# Patient Record
Sex: MALE | Race: WHITE | NOT HISPANIC OR LATINO | Employment: OTHER | URBAN - METROPOLITAN AREA
[De-identification: names, ages, dates, MRNs, and addresses within clinical notes are randomized per-mention and may not be internally consistent; named-entity substitution may affect disease eponyms.]

---

## 2018-05-08 ENCOUNTER — NEW PATIENT (OUTPATIENT)
Dept: URBAN - METROPOLITAN AREA CLINIC 26 | Facility: CLINIC | Age: 83
End: 2018-05-08

## 2018-05-08 VITALS
DIASTOLIC BLOOD PRESSURE: 60 MMHG | HEIGHT: 62 IN | WEIGHT: 138 LBS | BODY MASS INDEX: 25.4 KG/M2 | SYSTOLIC BLOOD PRESSURE: 132 MMHG

## 2018-05-08 DIAGNOSIS — H35.3231: ICD-10-CM

## 2018-05-08 DIAGNOSIS — H35.61: ICD-10-CM

## 2018-05-08 DIAGNOSIS — H43.393: ICD-10-CM

## 2018-05-08 PROCEDURE — 99204 OFFICE O/P NEW MOD 45 MIN: CPT

## 2018-05-08 PROCEDURE — 67028 INJECTION EYE DRUG: CPT

## 2018-05-08 PROCEDURE — 92134 CPTRZ OPH DX IMG PST SGM RTA: CPT

## 2018-05-08 PROCEDURE — 92250 FUNDUS PHOTOGRAPHY W/I&R: CPT

## 2018-05-08 ASSESSMENT — VISUAL ACUITY
OS_SC: CF 3FT
OD_SC: 20/400+1

## 2018-05-08 ASSESSMENT — TONOMETRY
OD_IOP_MMHG: 13
OS_IOP_MMHG: 11

## 2018-08-29 NOTE — PATIENT DISCUSSION
offered to dilate the eye today but pt refused as he is driving. No other signs of trauma. Disc call with any changes to va.

## 2019-01-31 ENCOUNTER — HOSPITAL ENCOUNTER (EMERGENCY)
Facility: HOSPITAL | Age: 84
Discharge: HOME | End: 2019-01-31
Attending: EMERGENCY MEDICINE
Payer: MEDICARE

## 2019-01-31 ENCOUNTER — APPOINTMENT (OUTPATIENT)
Dept: CT IMAGING | Facility: HOSPITAL | Age: 84
End: 2019-01-31
Attending: EMERGENCY MEDICINE
Payer: MEDICARE

## 2019-01-31 VITALS
HEIGHT: 63 IN | BODY MASS INDEX: 22.68 KG/M2 | OXYGEN SATURATION: 99 % | RESPIRATION RATE: 18 BRPM | HEART RATE: 91 BPM | TEMPERATURE: 97 F | WEIGHT: 128 LBS | SYSTOLIC BLOOD PRESSURE: 133 MMHG | DIASTOLIC BLOOD PRESSURE: 67 MMHG

## 2019-01-31 DIAGNOSIS — H01.00B BLEPHARITIS OF BOTH UPPER AND LOWER EYELID OF LEFT EYE, UNSPECIFIED TYPE: ICD-10-CM

## 2019-01-31 DIAGNOSIS — S09.90XA INJURY OF HEAD, INITIAL ENCOUNTER: Primary | ICD-10-CM

## 2019-01-31 DIAGNOSIS — S01.01XA LACERATION OF SCALP, INITIAL ENCOUNTER: ICD-10-CM

## 2019-01-31 LAB
ANION GAP SERPL CALC-SCNC: 12 MMOL/L (ref 0–18)
APTT PPP: 35.4 SECONDS (ref 23.2–35.3)
BASOPHILS # BLD AUTO: 0.06 X10(3) UL (ref 0–0.2)
BASOPHILS NFR BLD AUTO: 0.7 %
BUN SERPL-MCNC: 38 MG/DL (ref 8–20)
BUN/CREAT SERPL: 21.7 (ref 10–20)
CALCIUM SERPL-MCNC: 9.4 MG/DL (ref 8.5–10.5)
CHLORIDE SERPL-SCNC: 99 MMOL/L (ref 95–110)
CO2 SERPL-SCNC: 24 MMOL/L (ref 22–32)
CREAT SERPL-MCNC: 1.75 MG/DL (ref 0.5–1.5)
DEPRECATED RDW RBC AUTO: 54.4 FL (ref 35.1–46.3)
EOSINOPHIL # BLD AUTO: 0.4 X10(3) UL (ref 0–0.7)
EOSINOPHIL NFR BLD AUTO: 4.8 %
ERYTHROCYTE [DISTWIDTH] IN BLOOD BY AUTOMATED COUNT: 15.9 % (ref 11–15)
GLUCOSE SERPL-MCNC: 147 MG/DL (ref 70–99)
HCT VFR BLD AUTO: 39.9 % (ref 39–53)
HGB BLD-MCNC: 11.9 G/DL (ref 13–17.5)
IMM GRANULOCYTES # BLD AUTO: 0.06 X10(3) UL (ref 0–1)
IMM GRANULOCYTES NFR BLD: 0.7 %
INR BLD: 1.1 (ref 0.9–1.2)
LYMPHOCYTES # BLD AUTO: 1.11 X10(3) UL (ref 1–4)
LYMPHOCYTES NFR BLD AUTO: 13.4 %
MCH RBC QN AUTO: 27.9 PG (ref 26–34)
MCHC RBC AUTO-ENTMCNC: 29.8 G/DL (ref 31–37)
MCV RBC AUTO: 93.7 FL (ref 80–100)
MONOCYTES # BLD AUTO: 0.79 X10(3) UL (ref 0.1–1)
MONOCYTES NFR BLD AUTO: 9.5 %
NEUTROPHILS # BLD AUTO: 5.86 X10 (3) UL (ref 1.5–7.7)
NEUTROPHILS # BLD AUTO: 5.86 X10(3) UL (ref 1.5–7.7)
NEUTROPHILS NFR BLD AUTO: 70.9 %
OSMOLALITY UR CALC.SUM OF ELEC: 292 MOSM/KG (ref 275–295)
PLATELET # BLD AUTO: 207 10(3)UL (ref 150–450)
POTASSIUM SERPL-SCNC: 3.9 MMOL/L (ref 3.3–5.1)
PROTHROMBIN TIME: 14.3 SECONDS (ref 11.8–14.5)
RBC # BLD AUTO: 4.26 X10(6)UL (ref 3.8–5.8)
SODIUM SERPL-SCNC: 135 MMOL/L (ref 136–144)
WBC # BLD AUTO: 8.3 X10(3) UL (ref 4–11)

## 2019-01-31 PROCEDURE — 36415 COLL VENOUS BLD VENIPUNCTURE: CPT

## 2019-01-31 PROCEDURE — 85610 PROTHROMBIN TIME: CPT | Performed by: EMERGENCY MEDICINE

## 2019-01-31 PROCEDURE — 85730 THROMBOPLASTIN TIME PARTIAL: CPT | Performed by: EMERGENCY MEDICINE

## 2019-01-31 PROCEDURE — 99284 EMERGENCY DEPT VISIT MOD MDM: CPT

## 2019-01-31 PROCEDURE — 90471 IMMUNIZATION ADMIN: CPT

## 2019-01-31 PROCEDURE — 80048 BASIC METABOLIC PNL TOTAL CA: CPT | Performed by: EMERGENCY MEDICINE

## 2019-01-31 PROCEDURE — 12001 RPR S/N/AX/GEN/TRNK 2.5CM/<: CPT

## 2019-01-31 PROCEDURE — 85025 COMPLETE CBC W/AUTO DIFF WBC: CPT | Performed by: EMERGENCY MEDICINE

## 2019-01-31 PROCEDURE — 72125 CT NECK SPINE W/O DYE: CPT | Performed by: EMERGENCY MEDICINE

## 2019-01-31 PROCEDURE — 70450 CT HEAD/BRAIN W/O DYE: CPT | Performed by: EMERGENCY MEDICINE

## 2019-01-31 RX ORDER — LOVASTATIN 40 MG/1
40 TABLET ORAL NIGHTLY
COMMUNITY

## 2019-01-31 RX ORDER — LIDOCAINE HYDROCHLORIDE 10 MG/ML
INJECTION, SOLUTION EPIDURAL; INFILTRATION; INTRACAUDAL; PERINEURAL
Status: DISCONTINUED
Start: 2019-01-31 | End: 2019-01-31

## 2019-01-31 RX ORDER — POLYETHYLENE GLYCOL 3350 17 G/17G
17 POWDER, FOR SOLUTION ORAL DAILY
COMMUNITY

## 2019-01-31 RX ORDER — DIGOXIN 125 MCG
125 TABLET ORAL
COMMUNITY

## 2019-01-31 RX ORDER — LIDOCAINE HYDROCHLORIDE 10 MG/ML
30 INJECTION, SOLUTION EPIDURAL; INFILTRATION; INTRACAUDAL; PERINEURAL ONCE
Status: COMPLETED | OUTPATIENT
Start: 2019-01-31 | End: 2019-01-31

## 2019-01-31 RX ORDER — MELATONIN
325
Status: ON HOLD | COMMUNITY
End: 2021-01-01

## 2019-01-31 RX ORDER — LIDOCAINE HYDROCHLORIDE 10 MG/ML
30 INJECTION, SOLUTION EPIDURAL; INFILTRATION; INTRACAUDAL; PERINEURAL ONCE
Status: DISCONTINUED | OUTPATIENT
Start: 2019-01-31 | End: 2019-01-31

## 2019-01-31 RX ORDER — LEVOTHYROXINE SODIUM 0.12 MG/1
125 TABLET ORAL
COMMUNITY

## 2019-02-01 NOTE — ED INITIAL ASSESSMENT (HPI)
Pt fell at home. Was going to the bathroom, misjudged distance bewteen toilet, fell back, hitting back of the head on the toilet. Lac noted to back of head. Denies LOC.

## 2019-02-01 NOTE — ED PROVIDER NOTES
Patient Seen in: [facility] AND [facility] Emergency Department    History   Patient presents with:  Fall (musculoskeletal, neurologic)    Stated Complaint: fall    HPI    Presents to the emergency department after falling.   He states that he was sitting down on Cardiovascular: Normal rate and regular rhythm. No murmur heard. Pulmonary/Chest: Effort normal and breath sounds normal. No respiratory distress. Abdominal: Soft. He exhibits no distension. There is no tenderness.    Musculoskeletal: Normal range of 1/31/2019  CONCLUSION:  Moderate chronic microvascular ischemic disease without acute intracranial abnormality. Mild posterior occipital scalp soft tissue swelling.   Dictated by (CST): Shaniqua Croft MD on 1/31/2019 at 19:27     Approved by (CST): Tamia Solano both upper and lower eyelid of left eye, unspecified type    Disposition:  Discharge  1/31/2019  8:09 pm    Follow-up:  Fadia Aguilera  [address] [place] [address] [phone]    Schedule an appointment as soon as possible for a vis

## 2021-01-01 ENCOUNTER — HOSPITAL ENCOUNTER (EMERGENCY)
Facility: HOSPITAL | Age: 86
Discharge: HOME | End: 2021-01-01
Attending: EMERGENCY MEDICINE
Payer: MEDICARE

## 2021-01-01 ENCOUNTER — APPOINTMENT (OUTPATIENT)
Dept: CT IMAGING | Facility: HOSPITAL | Age: 86
End: 2021-01-01
Attending: EMERGENCY MEDICINE
Payer: MEDICARE

## 2021-01-01 ENCOUNTER — APPOINTMENT (OUTPATIENT)
Dept: CV DIAGNOSTICS | Facility: HOSPITAL | Age: 86
DRG: 683 | End: 2021-01-01
Attending: NURSE PRACTITIONER
Payer: MEDICARE

## 2021-01-01 ENCOUNTER — APPOINTMENT (OUTPATIENT)
Dept: GENERAL RADIOLOGY | Facility: HOSPITAL | Age: 86
DRG: 683 | End: 2021-01-01
Attending: EMERGENCY MEDICINE
Payer: MEDICARE

## 2021-01-01 ENCOUNTER — HOSPITAL ENCOUNTER (INPATIENT)
Facility: HOSPITAL | Age: 86
LOS: 2 days | Discharge: HOME HEALTH | DRG: 683 | End: 2021-01-01
Attending: EMERGENCY MEDICINE | Admitting: HOSPITALIST
Payer: MEDICARE

## 2021-01-01 ENCOUNTER — APPOINTMENT (OUTPATIENT)
Dept: NUCLEAR MEDICINE | Facility: HOSPITAL | Age: 86
DRG: 683 | End: 2021-01-01
Attending: EMERGENCY MEDICINE
Payer: MEDICARE

## 2021-01-01 ENCOUNTER — TELEPHONE (OUTPATIENT)
Dept: PALLIATIVE CARE | Facility: HOSPITAL | Age: 86
End: 2021-01-01

## 2021-01-01 ENCOUNTER — APPOINTMENT (OUTPATIENT)
Dept: GENERAL RADIOLOGY | Facility: HOSPITAL | Age: 86
DRG: 683 | End: 2021-01-01
Attending: HOSPITALIST
Payer: MEDICARE

## 2021-01-01 ENCOUNTER — APPOINTMENT (OUTPATIENT)
Dept: GENERAL RADIOLOGY | Facility: HOSPITAL | Age: 86
End: 2021-01-01
Attending: EMERGENCY MEDICINE
Payer: MEDICARE

## 2021-01-01 VITALS
DIASTOLIC BLOOD PRESSURE: 56 MMHG | SYSTOLIC BLOOD PRESSURE: 133 MMHG | TEMPERATURE: 99 F | HEART RATE: 67 BPM | HEIGHT: 63 IN | OXYGEN SATURATION: 100 % | RESPIRATION RATE: 18 BRPM | BODY MASS INDEX: 19.49 KG/M2 | WEIGHT: 110 LBS

## 2021-01-01 VITALS
HEART RATE: 76 BPM | WEIGHT: 99.5 LBS | OXYGEN SATURATION: 100 % | RESPIRATION RATE: 18 BRPM | TEMPERATURE: 98 F | DIASTOLIC BLOOD PRESSURE: 74 MMHG | HEIGHT: 68 IN | BODY MASS INDEX: 15.08 KG/M2 | SYSTOLIC BLOOD PRESSURE: 93 MMHG

## 2021-01-01 VITALS
SYSTOLIC BLOOD PRESSURE: 123 MMHG | TEMPERATURE: 99 F | RESPIRATION RATE: 16 BRPM | OXYGEN SATURATION: 96 % | BODY MASS INDEX: 16.07 KG/M2 | DIASTOLIC BLOOD PRESSURE: 49 MMHG | HEIGHT: 66 IN | WEIGHT: 100 LBS | HEART RATE: 109 BPM

## 2021-01-01 DIAGNOSIS — N17.9 AKI (ACUTE KIDNEY INJURY) (HCC): Primary | ICD-10-CM

## 2021-01-01 DIAGNOSIS — R91.1 PULMONARY NODULE: ICD-10-CM

## 2021-01-01 DIAGNOSIS — R11.2 NON-INTRACTABLE VOMITING WITH NAUSEA, UNSPECIFIED VOMITING TYPE: ICD-10-CM

## 2021-01-01 DIAGNOSIS — R77.8 ELEVATED TROPONIN: ICD-10-CM

## 2021-01-01 DIAGNOSIS — S09.8XXA BLUNT HEAD TRAUMA, INITIAL ENCOUNTER: Primary | ICD-10-CM

## 2021-01-01 PROCEDURE — 99223 1ST HOSP IP/OBS HIGH 75: CPT | Performed by: REGISTERED NURSE

## 2021-01-01 PROCEDURE — 93306 TTE W/DOPPLER COMPLETE: CPT | Performed by: NURSE PRACTITIONER

## 2021-01-01 PROCEDURE — 99239 HOSP IP/OBS DSCHRG MGMT >30: CPT | Performed by: HOSPITALIST

## 2021-01-01 PROCEDURE — 70450 CT HEAD/BRAIN W/O DYE: CPT | Performed by: EMERGENCY MEDICINE

## 2021-01-01 PROCEDURE — 99284 EMERGENCY DEPT VISIT MOD MDM: CPT

## 2021-01-01 PROCEDURE — 96374 THER/PROPH/DIAG INJ IV PUSH: CPT

## 2021-01-01 PROCEDURE — 71045 X-RAY EXAM CHEST 1 VIEW: CPT | Performed by: EMERGENCY MEDICINE

## 2021-01-01 PROCEDURE — 80048 BASIC METABOLIC PNL TOTAL CA: CPT | Performed by: EMERGENCY MEDICINE

## 2021-01-01 PROCEDURE — 78582 LUNG VENTILAT&PERFUS IMAGING: CPT | Performed by: EMERGENCY MEDICINE

## 2021-01-01 PROCEDURE — 74230 X-RAY XM SWLNG FUNCJ C+: CPT | Performed by: HOSPITALIST

## 2021-01-01 PROCEDURE — 85025 COMPLETE CBC W/AUTO DIFF WBC: CPT | Performed by: EMERGENCY MEDICINE

## 2021-01-01 PROCEDURE — 93005 ELECTROCARDIOGRAM TRACING: CPT

## 2021-01-01 PROCEDURE — 96361 HYDRATE IV INFUSION ADD-ON: CPT

## 2021-01-01 PROCEDURE — 93010 ELECTROCARDIOGRAM REPORT: CPT | Performed by: EMERGENCY MEDICINE

## 2021-01-01 PROCEDURE — 84484 ASSAY OF TROPONIN QUANT: CPT | Performed by: EMERGENCY MEDICINE

## 2021-01-01 PROCEDURE — 99223 1ST HOSP IP/OBS HIGH 75: CPT | Performed by: HOSPITALIST

## 2021-01-01 RX ORDER — ASPIRIN 81 MG/1
324 TABLET, CHEWABLE ORAL ONCE
Status: COMPLETED | OUTPATIENT
Start: 2021-01-01 | End: 2021-01-01

## 2021-01-01 RX ORDER — MELATONIN
325
Status: DISCONTINUED | OUTPATIENT
Start: 2021-01-01 | End: 2021-01-01

## 2021-01-01 RX ORDER — CHOLECALCIFEROL (VITAMIN D3) 125 MCG
1000 CAPSULE ORAL DAILY
Status: DISCONTINUED | OUTPATIENT
Start: 2021-01-01 | End: 2021-01-01

## 2021-01-01 RX ORDER — ONDANSETRON 2 MG/ML
4 INJECTION INTRAMUSCULAR; INTRAVENOUS ONCE
Status: COMPLETED | OUTPATIENT
Start: 2021-01-01 | End: 2021-01-01

## 2021-01-01 RX ORDER — POLYETHYLENE GLYCOL 3350 17 G/17G
17 POWDER, FOR SOLUTION ORAL DAILY
Status: DISCONTINUED | OUTPATIENT
Start: 2021-01-01 | End: 2021-01-01

## 2021-01-01 RX ORDER — DIGOXIN 125 MCG
125 TABLET ORAL
Status: DISCONTINUED | OUTPATIENT
Start: 2021-01-01 | End: 2021-01-01

## 2021-01-01 RX ORDER — CYANOCOBALAMIN (VITAMIN B-12) 2000 MCG
1000 TABLET ORAL DAILY
Status: ON HOLD | COMMUNITY
End: 2021-01-01

## 2021-01-01 RX ORDER — MELATONIN
1000 DAILY
Status: ON HOLD | COMMUNITY
End: 2021-01-01

## 2021-01-01 RX ORDER — ACETAMINOPHEN 325 MG/1
650 TABLET ORAL EVERY 6 HOURS PRN
Status: DISCONTINUED | OUTPATIENT
Start: 2021-01-01 | End: 2021-01-01

## 2021-01-01 RX ORDER — IPRATROPIUM BROMIDE AND ALBUTEROL SULFATE 2.5; .5 MG/3ML; MG/3ML
3 SOLUTION RESPIRATORY (INHALATION) EVERY 6 HOURS PRN
Status: DISCONTINUED | OUTPATIENT
Start: 2021-01-01 | End: 2021-01-01

## 2021-01-01 RX ORDER — ATORVASTATIN CALCIUM 10 MG/1
10 TABLET, FILM COATED ORAL NIGHTLY
Status: DISCONTINUED | OUTPATIENT
Start: 2021-01-01 | End: 2021-01-01

## 2021-01-01 RX ORDER — ONDANSETRON 4 MG/1
4 TABLET, ORALLY DISINTEGRATING ORAL EVERY 4 HOURS PRN
Qty: 10 TABLET | Refills: 0 | Status: SHIPPED | OUTPATIENT
Start: 2021-01-01 | End: 2021-01-01

## 2021-01-01 RX ORDER — BUDESONIDE 0.5 MG/2ML
0.5 INHALANT ORAL 3 TIMES DAILY
COMMUNITY

## 2021-01-01 RX ORDER — METOPROLOL SUCCINATE 25 MG/1
25 TABLET, EXTENDED RELEASE ORAL DAILY
Qty: 30 TABLET | Refills: 0 | Status: SHIPPED | OUTPATIENT
Start: 2021-01-01

## 2021-01-01 RX ORDER — SODIUM CHLORIDE 450 MG/100ML
INJECTION, SOLUTION INTRAVENOUS CONTINUOUS
Status: DISCONTINUED | OUTPATIENT
Start: 2021-01-01 | End: 2021-01-01

## 2021-01-01 RX ORDER — BUDESONIDE 0.5 MG/2ML
0.5 INHALANT ORAL 3 TIMES DAILY
Status: DISCONTINUED | OUTPATIENT
Start: 2021-01-01 | End: 2021-01-01

## 2021-01-01 RX ORDER — IPRATROPIUM BROMIDE AND ALBUTEROL SULFATE 2.5; .5 MG/3ML; MG/3ML
3 SOLUTION RESPIRATORY (INHALATION)
COMMUNITY

## 2021-01-01 RX ORDER — ASPIRIN 81 MG/1
81 TABLET ORAL DAILY
Status: DISCONTINUED | OUTPATIENT
Start: 2021-01-01 | End: 2021-01-01

## 2021-01-16 NOTE — ED PROVIDER NOTES
Patient Seen in: [facility] AND [facility] Emergency Department      History   Patient presents with:  Fall  Laceration/Abrasion    Stated Complaint: fall/ lac on blood thinners    HPI/Subjective:   HPI    66-year-old male with past medical history significant f are normal. PERRLA  Neck: Normal range of motion. Neck supple. No tracheal deviation present. CV: s1s2+, RRR, no m/r/g, normal distal pulses  Pulmonary/Chest: CTA b/l with no rales, wheezes. No chest wall tenderness  Abdominal: Nontender. Nondistended.

## 2021-01-16 NOTE — ED INITIAL ASSESSMENT (HPI)
Patient reports unwitnessed fall half hour ago when his legs gave out on him. States that he hit his head on his walker. Family reports no LOC. Hematoma noted to posterior L head with associated laceration obscured by dried blood. +thinners.  3L O2 home use

## 2021-03-31 NOTE — ED INITIAL ASSESSMENT (HPI)
Pt arrived via [facility], per ems pt had second mederna covid vaccine today, pt's son reported to ems sob and fever at home. EMS reported pt had episode of emesis at home, c/o mild abd pain. Pt denies cp. Pt 87% on roomair, initiated on 4L NC.  Pt glucose

## 2021-03-31 NOTE — ED PROVIDER NOTES
Patient Seen in: [facility] Emergency Department      History   Patient presents with:  Difficulty Breathing    Stated Complaint: SOB    HPI/Subjective:   HPI  History is provided by EMS and patient.     80-year-old male with history of lung cancer 0034 145/62   Pulse 03/31/21 0100 103   Resp 03/31/21 0100 22   Temp 03/31/21 0034 98.7 °F (37.1 °C)   Temp src 03/31/21 0034 Oral   SpO2 03/31/21 0034 100 %   O2 Device 03/31/21 0034 None (Room air)       Current:/49   Pulse 109   Temp 98.7 °F (37.1 mmol/L    BUN 57 (H) 7 - 18 mg/dL    Creatinine 2.15 (H) 0.70 - 1.30 mg/dL    BUN/CREA Ratio 26.5 (H) 10.0 - 20.0    Calcium, Total 9.3 8.5 - 10.1 mg/dL    Calculated Osmolality 308 (H) 275 - 295 mOsm/kg    GFR, Non- 26 (L) >=60    GFR, Afr scattered reticular opacities and groundglass which may be chronic assuming no symptoms of pneumonia. No definite consolidation. Postoperative changes in the mediastinum andSutures in the right hilar region.   Appearance the right hilum may be accentuat reviewed those reports. Complicating Factors: The patient already has does not have a problem list on file. to contribute to the complexity of his ED evaluation.     - pt  comfortable with d/c at this time, will d/c pt home now with Rx for zofran, pt to

## 2021-03-31 NOTE — ED QUICK NOTES
Reviewed discharge information with patient and patient's son. Patient's son  verbalized understanding, no further questions or complaints at this time. Patient is alert and orientated x3, in no apparent distress, wheelchair assistance to lobby.  Natty zhao

## 2021-06-03 PROBLEM — R77.8 ELEVATED TROPONIN: Status: ACTIVE | Noted: 2021-01-01

## 2021-06-03 PROBLEM — N17.9 AKI (ACUTE KIDNEY INJURY) (HCC): Status: ACTIVE | Noted: 2021-01-01

## 2021-06-03 PROBLEM — R91.1 PULMONARY NODULE: Status: ACTIVE | Noted: 2021-01-01

## 2021-06-03 NOTE — ED PROVIDER NOTES
Patient Seen in: [facility] Emergency Department      History   Patient presents with: Other    Stated Complaint: \"Not feeling right. \"    HPI/Subjective:   HPI    History is provided by EMS and patient.     80-year-old male on Eliquis, here with (36.4 °C) (Oral)   Resp 15   Ht 172.7 cm (5' 8\")   Wt 59 kg   SpO2 97%   BMI 19.77 kg/m²         Physical Exam  Vitals and nursing note reviewed. HENT:      Head: Normocephalic.       Mouth/Throat:      Mouth: Mucous membranes are moist.   Eyes:      Ext Calculated Osmolality 314 (H) 275 - 295 mOsm/kg    GFR, Non- 25 (L) >=60    GFR, -American 28 (L) >=60   Troponin I    Collection Time: 06/03/21  6:04 AM   Result Value Ref Range    Troponin 0.066 (HH) <0.045 ng/mL   CBC W/ DIFFERENT 100  (A) Negative mg/dL    Urobilinogen Urine <2.0 <2.0    Nitrite Urine Negative Negative    Leukocyte Esterase Urine Negative Negative    WBC Urine 1-5 0 - 5 /HPF    RBC Urine >10 (A) 0 - 2 /HPF    Bacteria Urine Rare (A) None Seen /HPF   Rapid SARS-CoV- Education regarding lifestyle modifications and the need for appropriate follow-up with their PCP to have their blood pressure re-checked within 1 week was provided.      Medical Record Review: I personally reviewed available prior medical records for any r

## 2021-06-03 NOTE — CM/SW NOTE
CHARLES called and spoke with patient wife, Dustin Delgado. Giulia confirms that patient lives in a home with her ( address correct on face sheet, 4 steps to enter but ramp is being installed).  Giulia states that she would like for CHARLES to call her son, Sancho Floyd as he is

## 2021-06-03 NOTE — ED QUICK NOTES
Rounding Completed    Plan of Care reviewed. Waiting for room assignment. Elimination needs assessed. Family at bedside. Bed is locked and in lowest position. Call light within reach.

## 2021-06-03 NOTE — HOME CARE LIAISON
Spoke with patient's son Sancho Floyd, provided with list of [address] providers from [address] [place] [address], patient choice is [address]. Agency reserved in [address] [place] [address] and contact information placed on AVS.   Financial interest disclosure provided to patient. SW updated.

## 2021-06-03 NOTE — ED QUICK NOTES
Orders for admission, patient is aware of plan and ready to go upstairs. Any questions, please call ED TODD son at extension [phone].      Type of COVID test sent: Rapid negative  COVID Suspicion level: Low    Titratable drug(s) infusing: normal saline bolus

## 2021-06-03 NOTE — CONSULTS
[facility] HOSP - [facility]    Cardiology Consultation    Justice Boland.  Patient Status:  Inpatient    10/8/1928 MRN [id number]   Location [facility] Attending Skeeter Jeans, MD   Hosp Day # 0 PCP Viola Bustos     6/3/2021  Reason f dizziness. Feels weak    History:  Past Medical History:   Diagnosis Date   • Arrhythmia    • Cancer ([facility] [address]    • Diabetes ([facility] [address]    • Hyperlipidemia      History reviewed. No pertinent surgical history. No family history on file.    reports that he has quit WBC 7.6 06/03/2021    HGB 8.4 06/03/2021    HCT 28.3 06/03/2021    .0 06/03/2021    CREATSERUM 2.24 06/03/2021    BUN 65 06/03/2021     06/03/2021    K 4.1 06/03/2021     06/03/2021    CO2 25.0 06/03/2021     06/03/2021    CA 1

## 2021-06-03 NOTE — H&P
[address]  Patient Status:  Inpatient    10/8/1928 MRN [id number]   Location [facility] Attending Jose G Mcguire MD   Hosp Day # 0 PCP Leslee Show     Date:  6/3/2021  Date o mouth daily with breakfast.        Review of Systems:   Review of Systems   Constitutional: Positive for malaise/fatigue. Negative for chills and fever. Respiratory: Negative for cough and sputum production.     Cardiovascular: Negative for chest pain and evaluate. 3. No acute pneumonia suggested.     Dictated by (CST): Rey Shea MD on 6/03/2021 at 6:28 AM     Finalized by (CST): Rey Shea MD on 6/03/2021 at 6:31 AM          NM LUNG VQ VENT / PERFUSION SCAN  ([id number])    Result Date: 6/3/2021  CO

## 2021-06-03 NOTE — SLP NOTE
ADULT SWALLOWING EVALUATION    ASSESSMENT    ASSESSMENT/OVERALL IMPRESSION:  PPE REQUIRED. THIS SLP WORE GOGGLES, GLOVES, AND DROPLET MASK. HANDS SANITIZED/WASHED UPON ENTRANCE/EXIT. SLP BSSE orders received and acknowledged.  A swallow evaluation Jessa Gonzalez ground/ Minced & Moist  Diet Recommendations - Liquids: Nectar thick liquids/ Mildly thick    Compensatory Strategies Recommended: Slow rate;Small bites and sips; Alternate consistencies; No straws  Aspiration Precautions: Upright position; Slow rate;Small bi Trialed: Nectar thick liquids; Thin liquids; Soft solid;Puree;Hard solid  Method of Presentation: Cup;Single sips; Self presentation;Spoon;Consecutive swallows  Patient Positioning: Upright;Midline    Oral Phase of Swallow: Impaired  Bolus Retrieval: Impaired

## 2021-06-03 NOTE — ED INITIAL ASSESSMENT (HPI)
Pt received via [facility] with c/o of \"Not feeling right. \" Pt states that his wife called the ambulance because, \"Something was not right. \" Pt is awake and alert, A&O x2, speaking in full sentences, denies any pain, states, \"I feel better. \"

## 2021-06-03 NOTE — ED QUICK NOTES
Family reports patient is on oxygen at home 3-3.5 L at baseline.   Pt on 2L currently with oxygen saturations >95%

## 2021-06-04 PROBLEM — Z71.89 ADVANCE CARE PLANNING: Status: ACTIVE | Noted: 2021-01-01

## 2021-06-04 PROBLEM — Z71.89 GOALS OF CARE, COUNSELING/DISCUSSION: Status: ACTIVE | Noted: 2021-01-01

## 2021-06-04 NOTE — DIETARY NOTE
ADULT NUTRITION INITIAL ASSESSMENT    Pt is at high nutrition risk. Pt meets severe malnutrition criteria.       CRITERIA FOR MALNUTRITION DIAGNOSIS:  Criteria for severe malnutrition diagnosis: chronic illness related to energy intake less than 75% for gr LABS: reviewed  Recent Labs     06/03/21  0604   *   BUN 65*   CREATSERUM 2.24*   CA 11.6*      K 4.1      CO2 25.0   OSMOCALC 314*     GASTROINTESTINAL: +BM 6/2 and Loss of appetite  NUTRITION RELATED PHYSICAL FINDINGS:  - Nutrition protein each) BID and Magic Cup (290 calories/ 9 g protein each) chocolate BID  Rational/use of oral supplements discussed.   - Vitamin and mineral supplements: none  - Feeding assistance: meal set up and feed  - Nutrition education: assess education needs

## 2021-06-04 NOTE — PROGRESS NOTES
[facility]    Progress Note    Margo Kraft.  Patient Status:  Inpatient    10/8/1928 MRN [id number]   Location [address] [facility] Attending Jeffrey Stratton MD   Hosp Day # 1 PCP Ema CARTAGENA     HPI/Subjective:   David Larson NM LUNG VQ VENT / PERFUSION SCAN  ([id number]=[id number])    Result Date: 6/3/2021  CONCLUSION:  1. Low probability study for pulmonary embolus.     Dictated by (CST): Luciana Heredia MD on 6/03/2021 at 8:16 AM     Finalized by (CST): Dorothea Bullock Anjel Lujan  6/4/2021

## 2021-06-04 NOTE — PLAN OF CARE
Pt alert. Denies pain. Vital stable. Awaiting on 2d echo result . Call light within reach. Bed alarm in place.    Problem: Patient Centered Care  Goal: Patient preferences are identified and integrated in the patient's plan of care  Description: Riyao education with parent/legal guardian  - Teach family how to prepare feedings  - Teach family how to administer medications  - Obtain prescriptions and verify correct dosage of home medications  - Build confidence of parent/family by encouraging them to pro

## 2021-06-04 NOTE — SLP NOTE
ADULT VIDEOFLUOROSCOPIC SWALLOWING STUDY    Admission Date: 6/3/2021  Evaluation Date: 06/04/21  Radiologist: Dr. Daily Corona: Mechanical soft chopped/ Soft & Bite Sized  Diet Recommendations - Liquids: Tatyana  A full report from speech pathology to follow.               Dictated by (CST): Steph French MD on 6/04/2021 at 11:47 AM       Finalized by (CST):  Steph French MD on 6/04/2021 at 11:48 AM                Reason for Referral: R/O aspiration      F swallows  Effectiveness: No  HONEY THICK LIQUIDS/ MODERATELY THICK   Method of Presentation: Cup  Oral Phase of Swallow (VFSS - Honey Thick Liquids):  Within Functional Limits  Triggered at: Valleculae  Premature Spillage to: Valleculae  Residue Severity, L as evidenced by anterior spillage of thin liquids, prolonged mastication, premature spillage, penetration with suspected aspiration, and pharyngeal retention. Pt with increased oral transit time with hard solids.  Premature spillage to valleculae (all trial time across 2 sessions.   In Progress   Goal #4 Trial dysphagia exercises (BOT, effortful swallow, laryngeal elevation, laryngeal adduction) In Progress     PLAN: SLP to f/u x2 meal assessments, monitor CXR, continue family education, and trial dysphagia ex

## 2021-06-04 NOTE — OCCUPATIONAL THERAPY NOTE
OCCUPATIONAL THERAPY EVALUATION - INPATIENT     Room Number: 102/603-L  Evaluation Date: 6/4/2021  Type of Evaluation: Initial  Presenting Problem:  (fall, weakness)    Physician Order: IP Consult to Occupational Therapy  Reason for Therapy: ADL/IADL Dysfu elderly and with her own physical limitations and she is unable to safely physically assist the patient. Skilled Services Provided:   To assess pt's safe participation during daily tasks while also providing intermittent education to maximize safety and been staying in the area to assist in helping his parents.  Patient chronically on 3L     SUBJECTIVE  \"I would like to nap\"     OCCUPATIONAL THERAPY EXAMINATION      OBJECTIVE  Precautions: Bed/chair alarm;Hard of hearing  Fall Risk: High fall risk    RIVERA addressed; Family present    OT Goals  Patients self stated goal is: not stated     Patient will complete toilet transfer with CGA using LRD  Comment:     Patient will complete toileting with CGA  Comment:     Patient will complete LE dressing with min a

## 2021-06-04 NOTE — CONSULTS
[address]  Patient Status:  Inpatient    10/8/1928 MRN [id number]   Location [address] [facility] Attending Chichi Pabon MD   Hosp Day # 1 [facility] [address] [facility]     Date of Cons kids, sons Elizabeth Parry in [place] and dtr [address] Prior to Admit: lives with wife  Occupational History: Retired,     Substance History:  Smoking Status: former  Hx of Substance Use/Abuse: no    Presybeterian/Cultural Information  Reli INR 1.1 01/31/2019    PTT 35.4 (H) 01/31/2019       Chemistry:  Lab Results   Component Value Date    CREATSERUM 2.24 (H) 06/03/2021    BUN 65 (H) 06/03/2021     06/03/2021    K 4.1 06/03/2021     06/03/2021    CO2 25.0 06/03/2021     apparent distress. Weak, frail, cachectic appearing  HEENT: No focal deficits. +dry mucous membranes  Cardiac: Tachycardic, irregular rate and rhythm, S1, S2 normal, no murmur, rub or gallop. Lungs: Diminished bilaterally. Normal excursions and effort. and hospice care. Palliative care handout provided.  I discussed the benefits of palliative care to include assistance with arising symptom management needs, extra layer of support, facilitate GOC/ACP discussions, ensure GOC are respected throughout healthc g-tube    Assessment/Recommendations:    ASHLEY (acute kidney injury) ([facility] [address]  -monitor    Elevated troponin  -per cardiology, family prefers to avoid invasive procedures at advanced age and frailty    Weakness  -PT/OT    Afib  -per cardiology    COPD with

## 2021-06-04 NOTE — PROGRESS NOTES
[facility]    Progress Note    Reji Dooley.  Patient Status:  Inpatient    10/8/1928 MRN [id number]   Location [facility] Attending Ruby Lim MD   Hosp Day # 1 PCP [facility]       Assessment and Plan: frail and weak  Neck:supple,no JVD  Pulm: Lungs decreased R base, normal respiratory effort  CV: Heart with irregularly irregular rate and rhythm, nl S1,S2 ,no murmur  Abd: Abdomen soft, nontender, nondistended,  Ext:  no clubbing, no cyanosis,no edema.   R Date: 6/3/2021  CONCLUSION:  1. Low probability study for pulmonary embolus.     Dictated by (CST): Darrin Heredia MD on 6/03/2021 at 8:16 AM     Finalized by (CST): Darrin Heredia MD on 6/03/2021 at 8:24 AM          EKG 12 Lead    Result

## 2021-06-04 NOTE — SLP NOTE
SPEECH DAILY NOTE - INPATIENT    ASSESSMENT & PLAN   ASSESSMENT  PPE REQUIRED. THIS THERAPIST WORE GLOVES, DROPLET MASK, AND GOGGLES FOR DURATION OF TX SESSION. HANDS WASHED UPON ENTRANCE/EXIT.      SLP f/u for ongoing dysphagia tx/meal assessment per recom changes.  Right apical pleural thickening but asymmetric. 2. Suspect 2.1 x 2.0 cm rounded masslike density in the right upper lobe suspect for a nodule or neoplasm.  Recommend CT scanning to further evaluate.    3. No acute pneumonia suggested.       Dict of the time across 2-3 sessions. Pt self fed with moderate assist taking slow/small bites/sips while upright in 90 degrees in bed. No straws observed at bedside. In Progress   Goal #4 VFSS to rule out aspiration and/or determine further dysphagia goals.

## 2021-06-04 NOTE — CM/SW NOTE
CHARLES called and spoke with patient son, Óscar Marshall. Óscar Marshall confirmed that plan will be for patient to return home with [facility] and palliative care. Óscar Marshall requesting hospital bed for patient. CHARLES also emailed list of private pay caregivers upon req.

## 2021-06-04 NOTE — PHYSICAL THERAPY NOTE
PHYSICAL THERAPY EVALUATION - INPATIENT     Room Number: 909/549-I  Evaluation Date: 6/4/2021  Type of Evaluation: Initial   Physician Order: PT Eval and Treat    Presenting Problem:  ASHLEY  Reason for Therapy: Mobility Dysfunction and Discharge Planning MEDICAL/SOCIAL HISTORY     History related to current admission: ASHLEY     Problem List  Principal Problem:    ASHLEY (acute kidney injury) ([facility] [address]  Active Problems:    Elevated troponin    Pulmonary nodule    Goals of care, counseling/discussion    Advance care Little   -   Moving from lying on back to sitting on the side of the bed?: A Little   How much help from another person does the patient currently need. ..   -   Moving to and from a bed to a chair (including a wheelchair)?: A Little   -   Need to walk in h

## 2021-06-04 NOTE — CM/SW NOTE
Family would like for patient to return home with home health/palliatiive care. [facility] accepts for home health. CHARLES attempted to call son, Seb Alexander to discuss further needs, no answer. CHARLES lvm req a call back.      Solomon Rojo, MSW, LSW

## 2021-06-04 NOTE — PLAN OF CARE
Palliative consulted today-- family at bedside and agreeable. POLST form updated and in chart. Video swallow took place today-- diet changed to honey thick. Echo resulted. PT/OT worked w/ pt today-- RUE weight bearing as tolerated.     Problem: Patient Cent screen(s)  - Complete  Screens  - Complete Car Seat Challenge per policy  - Complete CCHD screening  - Complete education with parent/legal guardian  - Teach family how to prepare feedings  - Teach family how to administer medications  - Obtain pres Case Management Department for coordinating discharge planning if the patient needs post-hospital services based on physician/LIP order or complex needs related to functional status, cognitive ability or social support system  Outcome: Progressing     Prob

## 2021-06-05 NOTE — PLAN OF CARE
Problem: Patient Centered Care  Goal: Patient preferences are identified and integrated in the patient's plan of care  Description: Interventions:  - What would you like us to know as we care for you?  Hx of lung cancer and R lobectomy  - Provide timely, medications  - Obtain prescriptions and verify correct dosage of home medications  - Build confidence of parent/family by encouraging them to provide cares  - Administer immunizations and RSV prophylaxis as ordered  - Provide education handouts and proof o system  Outcome: Progressing     Problem: METABOLIC/FLUID AND ELECTROLYTES - ADULT  Goal: Electrolytes maintained within normal limits  Description: INTERVENTIONS:  - Monitor labs and rhythm and assess patient for signs and symptoms of electrolyte imbalanc Patient's family at bedside. Room close to RN station. Patient's sons providing transportation home. Discharge paperwork reviewed with patient's son, Hue Graham at bedside. Family brought patient's home O2 tank for transportation.

## 2021-06-05 NOTE — PLAN OF CARE
Problem: Patient Centered Care  Goal: Patient preferences are identified and integrated in the patient's plan of care  Description: Interventions:  - What would you like us to know as we care for you?  Hx of lung cancer and R lobectomy  - Provide timely, medications  - Obtain prescriptions and verify correct dosage of home medications  - Build confidence of parent/family by encouraging them to provide cares  - Administer immunizations and RSV prophylaxis as ordered  - Provide education handouts and proof o system  Outcome: Progressing     Problem: METABOLIC/FLUID AND ELECTROLYTES - ADULT  Goal: Electrolytes maintained within normal limits  Description: INTERVENTIONS:  - Monitor labs and rhythm and assess patient for signs and symptoms of electrolyte imbalanc

## 2021-06-05 NOTE — PROGRESS NOTES
On 6/5/21 I had a face to face encounter with Drake Woods for a semi electric hospital bed medical necessity evaluation.   Patient has a history of aspiration pneumonia which would require him to have to elevate his head 30 degrees or more in order to

## 2021-06-05 NOTE — DISCHARGE SUMMARY
[facility] HOSP - [facility]    Discharge Summary    Terrance Bruce.  Patient Status:  Inpatient    10/8/1928 MRN [id number]   Location [address] [facility] Attending Charmayne Cranker, MD   Hosp Day # 2 [facility]     Date of Admission: 6/3 comparison.     -discussed in great detail today with patient, wife and DIL. Offered angiogram but patient adamantly declined. He has also been declining blood draws and other medical therapies.   His wish is to go home today and does not want any more in interstitial fibrosis and mild hyperinflation from COPD changes. Right apical pleural thickening but asymmetric. 2. Suspect 2.1 x 2.0 cm rounded masslike density in the right upper lobe suspect for a nodule or neoplasm.   Recommend CT scanning to further e by mouth 2 (two) times daily. Refills: 0     aspirin 81 MG Tabs      Take 81 mg by mouth daily. Refills: 0     budesonide 0.5 MG/2ML Susp  Commonly known as: PULMICORT      Take 0.5 mg by nebulization 3 (three) times daily.    Refills: 0     digoxin 0.1 Please check your blood pressure 2-3 times a day. Keep a log. Hold the metoprolol if your heart rate / pulse is less than 60, systolic is less than 578 or diastolic blood pressure is less than 50.     If you reconsider getting a cardiac angiogram then p

## 2021-06-07 NOTE — TELEPHONE ENCOUNTER
I received a call from pt's son Paul East inquiring when [facility] would be starting to see pt at home. I encouraged him to follow up with [facility] and provided their contact info.     Son also asking to speak with hospital  re

## (undated) NOTE — ED AVS SNAPSHOT
Norma Sierra. MRN: [id number]    Department:  [facility] Emergency Department   Date of Visit:  1/31/2019           Disclosure     Insurance plans vary and the physician(s) referred by the ER may not be covered by your plan.  Please contact within the next three months to obtain basic health screening including reassessment of your blood pressure.     IF THERE IS ANY CHANGE OR WORSENING OF YOUR CONDITION, CALL YOUR PRIMARY CARE PHYSICIAN AT ONCE OR RETURN IMMEDIATELY TO THE EMERGENCY DEPARTMEN